# Patient Record
Sex: FEMALE | Race: WHITE
[De-identification: names, ages, dates, MRNs, and addresses within clinical notes are randomized per-mention and may not be internally consistent; named-entity substitution may affect disease eponyms.]

---
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
## 2019-03-20 ENCOUNTER — HOSPITAL ENCOUNTER (EMERGENCY)
Dept: HOSPITAL 10 - FTE | Age: 19
LOS: 1 days | Discharge: HOME | End: 2019-03-21
Payer: COMMERCIAL

## 2019-03-20 ENCOUNTER — HOSPITAL ENCOUNTER (EMERGENCY)
Dept: HOSPITAL 91 - FTE | Age: 19
LOS: 1 days | Discharge: HOME | End: 2019-03-21
Payer: COMMERCIAL

## 2019-03-20 VITALS — BODY MASS INDEX: 16.84 KG/M2 | WEIGHT: 72.75 LBS | HEIGHT: 55 IN

## 2019-03-20 DIAGNOSIS — H61.21: Primary | ICD-10-CM

## 2019-03-20 DIAGNOSIS — L03.311: ICD-10-CM

## 2019-03-20 DIAGNOSIS — L73.9: ICD-10-CM

## 2019-03-20 PROCEDURE — 99283 EMERGENCY DEPT VISIT LOW MDM: CPT

## 2019-03-21 VITALS — RESPIRATION RATE: 16 BRPM | HEART RATE: 88 BPM | SYSTOLIC BLOOD PRESSURE: 110 MMHG | DIASTOLIC BLOOD PRESSURE: 70 MMHG

## 2019-03-21 NOTE — ERD
ER Documentation


Chief Complaint


Chief Complaint





SORE THROAT WITH RIGHT EAR PAIN & SUPRA-PUBIC BUMP WITH REDNESS/PAIN





HPI


This is a 18-year-old female who was brought in by mother here in emergency 


department with complaints of sore throat, right ear pain, suprapubic "bump."  


Patient uses hearing aid at home.





LMP:   





Denies headache, head injury, loss of consciousness, dizziness, neck pain, neck 


stiffness, throat pain, difficulty swallowing, difficulty breathing lying flat, 


shoulder pain, chest pain, back pain, abdominal pain, nausea, vomiting, 


constipation, diarrhea, urinary symptoms, pregnancy or possibility being 


pregnant, loss of bowel and bladder control, trauma, injury, falls, difficulty 


walking due to pain, numbness or tingling sensation, calf pain, recent travel, 


recent major surgery in the last 3 weeks, calf pain, recent long travel, recent 


exposure to any illness, recent antibiotic use in the last 3 months, fever, 


chills, seizures.





Past medical history:


Surgical history:


Social: Denies smoking, use of alcoholic beverages, use of illegal drugs.





ROS


All systems reviewed and are negative except as per history of present illness.





Medications


Home Meds


Active Scripts


Ibuprofen* (Motrin*) 400 Mg Tab, 400 MG PO Q6H PRN for PAIN AND OR ELEVATED 


TEMP, #20 TAB


   Prov:ANNETTEILABANEDILMAAR F         3/21/19


Cephalexin* (Keflex*) 500 Mg Capsule, 500 MG PO QID for 5 Days, CAP


   Prov:PASILABAN,KLAR F         3/21/19


Carbamide Peroxide* (Debrox*) 6.5% -15 Ml Drops, 10 DROP RIGHT EAR BID for 4 


Days, EA


   Prov:PASILABANEDILMAAR F         3/21/19


Ibuprofen* (Ibuprofen*) 400 Mg Tablet, 400 MG PO Q6H PRN for PAIN, #20 TAB


   Prov:JADE,MAURILIO         5/26/17


Amoxicillin/Potassium Clav (Amox-Clav 875-125 mg Tablet) 875-125 mg Tab, 1 TAB 


PO BID for 10 Days, #20 TAB


   Prov:JADE,MAURILIO         5/26/17


Reported Medications


Loratadine* (Loratadine*) 10 Mg Tablet, 10 MG PO DAILY, #30 TAB


   2/17/17





Allergies


Allergies:  


Coded Allergies:  


     No Known Allergy (Verified , 2/17/17)





PMhx/Soc


History of Surgery:  Yes (JOSE KNEE SX, JOSE HIP SX, JOSE EAR TUBES IMPLANTS )


Anesthesia Reaction:  No


Hx Neurological Disorder:  No


Hx Respiratory Disorders:  No


Hx Cardiac Disorders:  No


Hx Psychiatric Problems:  No


Hx Miscellaneous Medical Probl:  Yes (ACHONDRPLASTIC DWARFISM )


Hx Alcohol Use:  No


Hx Substance Use:  No


Hx Tobacco Use:  No





Physical Exam


Vitals





Physical Exam


Const:   No acute distress


Head:   Atraumatic 


Eyes:    Normal Conjunctiva


ENT:    Normal External Ears, Nose and Mouth.  Right ear: 100% earwax (cerumen 


impaction).  No mastoid tenderness.  Left ear: 60% earwax.  TMs not 


erythematous.  No bleeding.  No discharge.  No mastoid tenderness.  Nose: There 


is no frontal or maxillary sinus tenderness palpation.  Throat: Uvula is in 


midline and nondisplaced.  Tonsils are +1 bilaterally without redness without 


exudates.  Tolerating secretions.  Patent airway.


Neck:               Full range of motion. No meningismus.  No nuchal rigidity.  


No signs of meningeal irritation.


Resp:   Clear to auscultation bilaterally


Cardio:   Regular rate and rhythm, no murmurs


Abd:    Soft, non tender, non distended. Normal bowel sounds


Skin:   No petechiae or rashes.  Circular swelling to the suprapubic area 


measuring approximately 2 cm in diameter (no fluctuance).  Examined with female 


chaperone, Cody GRIER.


Back:   No midline or flank tenderness


Ext:    No cyanosis, or edema


Neur:   Awake and alert.  No new neurological deficits as confirmed with mother.


Psych:    Normal Mood and Affect





Procedures/MDM


Diagnostic tests: Clinical exam.





Treatment: Not applicable.





Re-evaluation: Not in distress.





Differential diagnosis


I have low suspicion for deep space infection, mastoiditis, meningitis, 


peritonsillar abscess, sepsis.





Final diagnosis: Cellulitis/folliculitis.  Cerumen impaction.





Prescription: Debrox.  Motrin.  Keflex.





Follow-up with PCP in the next 24-48 hours.  Come back here in the emergency 


department for any new symptoms or any worsening symptoms.  





All questions and concerns were answered.  Mother verbalized understanding and 


agreed with plan of care.  





Hemodynamically stable on discharge.





Departure


Diagnosis:  


   Primary Impression:  


   Cellulitis


   Additional Impressions:  


   Folliculitis


   Cerumen impaction


Condition:  Stable





Additional Instructions:  


Follow-up with PCP in the next 24-48 hours.  Come back here in the emergency 


department for any new symptoms or any worsening symptoms.











MARISABEL TEAGUE               Mar 21, 2019 02:15

## 2019-11-05 ENCOUNTER — HOSPITAL ENCOUNTER (EMERGENCY)
Dept: HOSPITAL 27 - EMS | Age: 19
Discharge: HOME | End: 2019-11-05
Payer: SELF-PAY

## 2019-11-05 VITALS — DIASTOLIC BLOOD PRESSURE: 67 MMHG | SYSTOLIC BLOOD PRESSURE: 127 MMHG

## 2019-11-05 DIAGNOSIS — S09.90XA: Primary | ICD-10-CM
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
## 2019-11-05 PROCEDURE — 70450 CT HEAD/BRAIN W/O DYE: CPT
